# Patient Record
Sex: MALE | Race: BLACK OR AFRICAN AMERICAN
[De-identification: names, ages, dates, MRNs, and addresses within clinical notes are randomized per-mention and may not be internally consistent; named-entity substitution may affect disease eponyms.]

---

## 2019-06-28 NOTE — DIAGNOSTIC IMAGING REPORT
EXAMINATION: PA and lateral views of the chest.



COMPARISON: None



CLINICAL HISTORY:  Preoperative study for urology procedure.

     

DISCUSSION:



Lines/tubes:  None.



Lungs:  The lungs are well inflated and clear. There is no evidence of

pneumonia or pulmonary edema.



Pleura:  There is no pleural effusion or pneumothorax.



Heart and mediastinum:  The cardiomediastinal silhouette is normal.



Bones and soft tissues:  No acute bony abnormalities.  



IMPRESSION: 

No acute cardiopulmonary abnormalities.











Signed by: Dr. Ronald Conde M.D. on 6/28/2019 2:11 PM

## 2019-07-01 ENCOUNTER — HOSPITAL ENCOUNTER (INPATIENT)
Dept: HOSPITAL 88 - OR | Age: 69
LOS: 2 days | Discharge: HOME HEALTH | DRG: 713 | End: 2019-07-03
Attending: INTERNAL MEDICINE | Admitting: INTERNAL MEDICINE
Payer: MEDICARE

## 2019-07-01 VITALS — SYSTOLIC BLOOD PRESSURE: 130 MMHG | DIASTOLIC BLOOD PRESSURE: 74 MMHG

## 2019-07-01 DIAGNOSIS — E78.5: ICD-10-CM

## 2019-07-01 DIAGNOSIS — N17.9: ICD-10-CM

## 2019-07-01 DIAGNOSIS — I25.10: ICD-10-CM

## 2019-07-01 DIAGNOSIS — N39.41: ICD-10-CM

## 2019-07-01 DIAGNOSIS — E78.00: ICD-10-CM

## 2019-07-01 DIAGNOSIS — N40.1: Primary | ICD-10-CM

## 2019-07-01 DIAGNOSIS — I10: ICD-10-CM

## 2019-07-01 DIAGNOSIS — I69.354: ICD-10-CM

## 2019-07-01 DIAGNOSIS — N32.81: ICD-10-CM

## 2019-07-01 DIAGNOSIS — R35.1: ICD-10-CM

## 2019-07-01 DIAGNOSIS — R33.8: ICD-10-CM

## 2019-07-01 LAB
ANION GAP SERPL CALC-SCNC: 17.3 MMOL/L (ref 8–16)
BASOPHILS # BLD AUTO: 0 10*3/UL (ref 0–0.1)
BASOPHILS # BLD AUTO: 0 10*3/UL (ref 0–0.1)
BASOPHILS NFR BLD AUTO: 0.2 % (ref 0–1)
BASOPHILS NFR BLD AUTO: 0.2 % (ref 0–1)
BUN SERPL-MCNC: 22 MG/DL (ref 7–26)
BUN/CREAT SERPL: 18 (ref 6–25)
CALCIUM SERPL-MCNC: 8.6 MG/DL (ref 8.4–10.2)
CHLORIDE SERPL-SCNC: 107 MMOL/L (ref 98–107)
CO2 SERPL-SCNC: 23 MMOL/L (ref 22–29)
DEPRECATED NEUTROPHILS # BLD AUTO: 4.3 10*3/UL (ref 2.1–6.9)
DEPRECATED NEUTROPHILS # BLD AUTO: 5.2 10*3/UL (ref 2.1–6.9)
EGFRCR SERPLBLD CKD-EPI 2021: > 60 ML/MIN (ref 60–?)
EOSINOPHIL # BLD AUTO: 0 10*3/UL (ref 0–0.4)
EOSINOPHIL # BLD AUTO: 0 10*3/UL (ref 0–0.4)
EOSINOPHIL NFR BLD AUTO: 0 % (ref 0–6)
EOSINOPHIL NFR BLD AUTO: 0 % (ref 0–6)
ERYTHROCYTE [DISTWIDTH] IN CORD BLOOD: 14.2 % (ref 11.7–14.4)
ERYTHROCYTE [DISTWIDTH] IN CORD BLOOD: 14.2 % (ref 11.7–14.4)
GLUCOSE SERPLBLD-MCNC: 100 MG/DL (ref 74–118)
HCT VFR BLD AUTO: 39.3 % (ref 38.2–49.6)
HCT VFR BLD AUTO: 39.9 % (ref 38.2–49.6)
HGB BLD-MCNC: 13.9 G/DL (ref 14–18)
HGB BLD-MCNC: 14.3 G/DL (ref 14–18)
LYMPHOCYTES # BLD: 0.3 10*3/UL (ref 1–3.2)
LYMPHOCYTES # BLD: 0.7 10*3/UL (ref 1–3.2)
LYMPHOCYTES NFR BLD AUTO: 12 % (ref 18–39.1)
LYMPHOCYTES NFR BLD AUTO: 5.3 % (ref 18–39.1)
MCH RBC QN AUTO: 28.3 PG (ref 28–32)
MCH RBC QN AUTO: 28.5 PG (ref 28–32)
MCHC RBC AUTO-ENTMCNC: 35.4 G/DL (ref 31–35)
MCHC RBC AUTO-ENTMCNC: 35.8 G/DL (ref 31–35)
MCV RBC AUTO: 79.5 FL (ref 81–99)
MCV RBC AUTO: 79.9 FL (ref 81–99)
MONOCYTES # BLD AUTO: 0.2 10*3/UL (ref 0.2–0.8)
MONOCYTES # BLD AUTO: 0.5 10*3/UL (ref 0.2–0.8)
MONOCYTES NFR BLD AUTO: 3.9 % (ref 4.4–11.3)
MONOCYTES NFR BLD AUTO: 9.5 % (ref 4.4–11.3)
NEUTS SEG NFR BLD AUTO: 77.6 % (ref 38.7–80)
NEUTS SEG NFR BLD AUTO: 89.6 % (ref 38.7–80)
PLATELET # BLD AUTO: 198 X10E3/UL (ref 140–360)
PLATELET # BLD AUTO: 230 X10E3/UL (ref 140–360)
POTASSIUM SERPL-SCNC: 3.3 MMOL/L (ref 3.5–5.1)
RBC # BLD AUTO: 4.92 X10E6/UL (ref 4.3–5.7)
RBC # BLD AUTO: 5.02 X10E6/UL (ref 4.3–5.7)
SODIUM SERPL-SCNC: 144 MMOL/L (ref 136–145)

## 2019-07-01 PROCEDURE — 0V508ZZ DESTRUCTION OF PROSTATE, VIA NATURAL OR ARTIFICIAL OPENING ENDOSCOPIC: ICD-10-PCS | Performed by: UROLOGY

## 2019-07-01 PROCEDURE — 36415 COLL VENOUS BLD VENIPUNCTURE: CPT

## 2019-07-01 PROCEDURE — 76998 US GUIDE INTRAOP: CPT

## 2019-07-01 PROCEDURE — BT141ZZ FLUOROSCOPY OF KIDNEYS, URETERS AND BLADDER USING LOW OSMOLAR CONTRAST: ICD-10-PCS | Performed by: UROLOGY

## 2019-07-01 PROCEDURE — 76872 US TRANSRECTAL: CPT

## 2019-07-01 PROCEDURE — 85025 COMPLETE CBC W/AUTO DIFF WBC: CPT

## 2019-07-01 PROCEDURE — 93005 ELECTROCARDIOGRAM TRACING: CPT

## 2019-07-01 PROCEDURE — 0T788ZZ DILATION OF BILATERAL URETERS, VIA NATURAL OR ARTIFICIAL OPENING ENDOSCOPIC: ICD-10-PCS | Performed by: UROLOGY

## 2019-07-01 PROCEDURE — 88305 TISSUE EXAM BY PATHOLOGIST: CPT

## 2019-07-01 PROCEDURE — 0VB08ZX EXCISION OF PROSTATE, VIA NATURAL OR ARTIFICIAL OPENING ENDOSCOPIC, DIAGNOSTIC: ICD-10-PCS | Performed by: UROLOGY

## 2019-07-01 PROCEDURE — 97139 UNLISTED THERAPEUTIC PX: CPT

## 2019-07-01 PROCEDURE — 83735 ASSAY OF MAGNESIUM: CPT

## 2019-07-01 PROCEDURE — 80048 BASIC METABOLIC PNL TOTAL CA: CPT

## 2019-07-01 PROCEDURE — 74420 UROGRAPHY RTRGR +-KUB: CPT

## 2019-07-01 PROCEDURE — 71046 X-RAY EXAM CHEST 2 VIEWS: CPT

## 2019-07-01 RX ADMIN — DOCUSATE SODIUM SCH MG: 100 TABLET, FILM COATED ORAL at 18:43

## 2019-07-01 RX ADMIN — TAZOBACTAM SODIUM AND PIPERACILLIN SODIUM SCH MLS/HR: 375; 3 INJECTION, SOLUTION INTRAVENOUS at 21:15

## 2019-07-01 RX ADMIN — ATROPA BELLADONNA AND OPIUM PRN MG: 16.2; 6 SUPPOSITORY RECTAL at 18:43

## 2019-07-01 RX ADMIN — Medication SCH MG: at 18:43

## 2019-07-01 RX ADMIN — ACETAMINOPHEN AND CODEINE PHOSPHATE PRN EA: 300; 30 TABLET ORAL at 21:15

## 2019-07-01 RX ADMIN — POTASSIUM CHLORIDE, DEXTROSE MONOHYDRATE AND SODIUM CHLORIDE SCH MLS/HR: 150; 5; 450 INJECTION, SOLUTION INTRAVENOUS at 16:36

## 2019-07-01 NOTE — NUR
Pt received from PACU s/p TURP. Alert and oriented x3, with 3 way sierra draining cranberry 
colored urine. Saline lock to right hand with fluid infusing. Oriented to staff and 
surroundings. Encouraged to press call bell if help needed. Pt verbalized understanding of 
teaching. Will monitor

## 2019-07-01 NOTE — DIAGNOSTIC IMAGING REPORT
Transrectal ultrasound of the prostate, ultrasound guidance. 



TECHNIQUE: Sonographic guidance was provided to Dr. Rahat Jones for the purposes

of a transrectal prostate biopsy.



HISTORY: Elevated PSA



FINDINGS: 

The seminal vesicles are present and unremarkable.

 

The gland size measures  33.33 cubic cm.



The peripheral zone is homogeneous without focal nodules.



The transition zone demonstrates a BPH nodule in the left lobe.



IMPRESSION:

 As above.    



Signed by: Dr. Rishabh Prince MD on 7/1/2019 9:41 PM

## 2019-07-01 NOTE — XMS REPORT
Patient Summary Document

                             Created on: 2019



JOJO MOCTEZUMA

External Reference #: [id number]

: 1950

Sex: Male



Demographics







                          Address                   [address] [place] [address]

 

                          Home Phone                [phone]

 

                          Preferred Language        Unknown

 

                          Marital Status            Unknown

 

                          Yazdanism Affiliation     Unknown

 

                          Race                      Unknown

 

                                        Additional Race(s)  

 

                          Ethnic Group              Unknown





Author







                          Author                    [facility]

 

                          Address                   Unknown

 

                          Phone                     Unavailable







Care Team Providers







                    Care Team Member Name    Role                Phone

 

                    AMANDA DE LA FUENTE        Unavailable         Unavailable







Problems

This patient has no known problems.



Allergies, Adverse Reactions, Alerts

This patient has no known allergies or adverse reactions.



Medications

This patient has no known medications.



Results







           Test Description    Test Time    Test Comments    Text Results    Atomic Results    Result

 Comments

 

                CHEST 2 VIEWS    2019 14:09:00                                                             

                                             [facility]  
                     [address] [address]  
   Patient Name: JOJO MOCTEZUMA III                                   MR #: 
[id number]                     : 1950                                  
Age/Sex: 68/M  Acct #: [id number]                              Req #: [id number]
[id number]  Adm Physician:                                                      
Ordered by: AMANDA DE LA FUENTE MD                            Report #: [id number]       
Location: OR                                      Room/Bed:                     
___________________________________________________________________________________________________
   Procedure: [id number] DX/CHEST 2 VIEWS  Exam Date: 19                   
        Exam Time: 1330                                              REPORT 
STATUS: Signed       EXAMINATION: PA and lateral views of the chest.      LAWRENCE
RISON: None      CLINICAL HISTORY:  Preoperative study for urology procedure.   
       DISCUSSION:      Lines/tubes:  None.      Lungs:  The lungs are well 
inflated and clear. There is no evidence of   pneumonia or pulmonary edema.     
Pleura:  There is no pleural effusion or pneumothorax.      Heart and 
mediastinum:  The cardiomediastinal silhouette is normal.      Bones and soft 
tissues:  No acute bony abnormalities.        IMPRESSION:    No acute 
cardiopulmonary abnormalities.                  Signed by: Dr. Ubaldo Patel M.D. on 2019 2:11 PM        Dictated By: UBALDO PATEL MD  Electronically 
Signed By: UBALDO PATEL MD on 19 1411  Transcribed By: MAURICIO on 
19 1411       COPY TO:   AMANDA DE LA FUENTE MD

## 2019-07-02 VITALS — DIASTOLIC BLOOD PRESSURE: 101 MMHG | SYSTOLIC BLOOD PRESSURE: 175 MMHG

## 2019-07-02 VITALS — SYSTOLIC BLOOD PRESSURE: 162 MMHG | DIASTOLIC BLOOD PRESSURE: 96 MMHG

## 2019-07-02 VITALS — DIASTOLIC BLOOD PRESSURE: 83 MMHG | SYSTOLIC BLOOD PRESSURE: 143 MMHG

## 2019-07-02 VITALS — SYSTOLIC BLOOD PRESSURE: 154 MMHG | DIASTOLIC BLOOD PRESSURE: 97 MMHG

## 2019-07-02 VITALS — DIASTOLIC BLOOD PRESSURE: 89 MMHG | SYSTOLIC BLOOD PRESSURE: 176 MMHG

## 2019-07-02 VITALS — SYSTOLIC BLOOD PRESSURE: 176 MMHG | DIASTOLIC BLOOD PRESSURE: 89 MMHG

## 2019-07-02 VITALS — SYSTOLIC BLOOD PRESSURE: 143 MMHG | DIASTOLIC BLOOD PRESSURE: 83 MMHG

## 2019-07-02 VITALS — SYSTOLIC BLOOD PRESSURE: 107 MMHG | DIASTOLIC BLOOD PRESSURE: 62 MMHG

## 2019-07-02 LAB
ANION GAP SERPL CALC-SCNC: 14.5 MMOL/L (ref 8–16)
BASOPHILS # BLD AUTO: 0 10*3/UL (ref 0–0.1)
BASOPHILS NFR BLD AUTO: 0.1 % (ref 0–1)
BUN SERPL-MCNC: 23 MG/DL (ref 7–26)
BUN/CREAT SERPL: 16 (ref 6–25)
CALCIUM SERPL-MCNC: 8.5 MG/DL (ref 8.4–10.2)
CHLORIDE SERPL-SCNC: 110 MMOL/L (ref 98–107)
CO2 SERPL-SCNC: 22 MMOL/L (ref 22–29)
DEPRECATED NEUTROPHILS # BLD AUTO: 7.9 10*3/UL (ref 2.1–6.9)
EGFRCR SERPLBLD CKD-EPI 2021: 57 ML/MIN (ref 60–?)
EOSINOPHIL # BLD AUTO: 0.2 10*3/UL (ref 0–0.4)
EOSINOPHIL NFR BLD AUTO: 2 % (ref 0–6)
ERYTHROCYTE [DISTWIDTH] IN CORD BLOOD: 14 % (ref 11.7–14.4)
GLUCOSE SERPLBLD-MCNC: 112 MG/DL (ref 74–118)
HCT VFR BLD AUTO: 36.5 % (ref 38.2–49.6)
HGB BLD-MCNC: 12.9 G/DL (ref 14–18)
LYMPHOCYTES # BLD: 0.5 10*3/UL (ref 1–3.2)
LYMPHOCYTES NFR BLD AUTO: 5.8 % (ref 18–39.1)
LYMPHOCYTES NFR BLD MANUAL: 5 % (ref 19–48)
MCH RBC QN AUTO: 28 PG (ref 28–32)
MCHC RBC AUTO-ENTMCNC: 35.3 G/DL (ref 31–35)
MCV RBC AUTO: 79.2 FL (ref 81–99)
MONOCYTES # BLD AUTO: 0.6 10*3/UL (ref 0.2–0.8)
MONOCYTES NFR BLD AUTO: 6.6 % (ref 4.4–11.3)
MONOCYTES NFR BLD MANUAL: 7 % (ref 3.4–9)
NEUTS SEG NFR BLD AUTO: 85.2 % (ref 38.7–80)
NEUTS SEG NFR BLD MANUAL: 88 % (ref 40–74)
PLAT MORPH BLD: NORMAL
PLATELET # BLD AUTO: 202 X10E3/UL (ref 140–360)
PLATELET # BLD EST: ADEQUATE 10*3/UL
POTASSIUM SERPL-SCNC: 3.5 MMOL/L (ref 3.5–5.1)
RBC # BLD AUTO: 4.61 X10E6/UL (ref 4.3–5.7)
RBC MORPH BLD: NORMAL
SODIUM SERPL-SCNC: 143 MMOL/L (ref 136–145)

## 2019-07-02 RX ADMIN — TAZOBACTAM SODIUM AND PIPERACILLIN SODIUM SCH MLS/HR: 375; 3 INJECTION, SOLUTION INTRAVENOUS at 21:55

## 2019-07-02 RX ADMIN — Medication SCH MG: at 09:28

## 2019-07-02 RX ADMIN — DOCUSATE SODIUM SCH MG: 100 TABLET, FILM COATED ORAL at 09:28

## 2019-07-02 RX ADMIN — METOPROLOL TARTRATE SCH MG: 25 TABLET, FILM COATED ORAL at 17:59

## 2019-07-02 RX ADMIN — ATROPA BELLADONNA AND OPIUM PRN MG: 16.2; 6 SUPPOSITORY RECTAL at 13:20

## 2019-07-02 RX ADMIN — METOPROLOL TARTRATE SCH MG: 25 TABLET, FILM COATED ORAL at 09:28

## 2019-07-02 RX ADMIN — OXYBUTYNIN CHLORIDE SCH MG: 5 TABLET ORAL at 20:52

## 2019-07-02 RX ADMIN — POTASSIUM CHLORIDE, DEXTROSE MONOHYDRATE AND SODIUM CHLORIDE SCH MLS/HR: 150; 5; 450 INJECTION, SOLUTION INTRAVENOUS at 20:52

## 2019-07-02 RX ADMIN — OXYBUTYNIN CHLORIDE SCH MG: 5 TABLET ORAL at 15:00

## 2019-07-02 RX ADMIN — OXYBUTYNIN CHLORIDE SCH MG: 5 TABLET ORAL at 09:28

## 2019-07-02 RX ADMIN — POTASSIUM CHLORIDE, DEXTROSE MONOHYDRATE AND SODIUM CHLORIDE SCH MLS/HR: 150; 5; 450 INJECTION, SOLUTION INTRAVENOUS at 09:27

## 2019-07-02 RX ADMIN — Medication SCH MG: at 13:10

## 2019-07-02 RX ADMIN — TAZOBACTAM SODIUM AND PIPERACILLIN SODIUM SCH MLS/HR: 375; 3 INJECTION, SOLUTION INTRAVENOUS at 15:00

## 2019-07-02 RX ADMIN — ACETAMINOPHEN AND CODEINE PHOSPHATE PRN EA: 300; 30 TABLET ORAL at 07:16

## 2019-07-02 RX ADMIN — ATROPA BELLADONNA AND OPIUM PRN MG: 16.2; 6 SUPPOSITORY RECTAL at 03:15

## 2019-07-02 RX ADMIN — Medication SCH MG: at 17:59

## 2019-07-02 RX ADMIN — TAZOBACTAM SODIUM AND PIPERACILLIN SODIUM SCH MLS/HR: 375; 3 INJECTION, SOLUTION INTRAVENOUS at 05:24

## 2019-07-02 RX ADMIN — POTASSIUM CHLORIDE, DEXTROSE MONOHYDRATE AND SODIUM CHLORIDE SCH MLS/HR: 150; 5; 450 INJECTION, SOLUTION INTRAVENOUS at 00:03

## 2019-07-02 RX ADMIN — DOCUSATE SODIUM SCH MG: 100 TABLET, FILM COATED ORAL at 16:05

## 2019-07-02 RX ADMIN — ACETAMINOPHEN AND CODEINE PHOSPHATE PRN EA: 300; 30 TABLET ORAL at 13:03

## 2019-07-02 NOTE — NUR
Patient c/o severe bladder spasms, urine appears light pink w/ small blood clots, bleeding 
noted around penile area, attempted to irrigate sierra catheter, resistance was noted, Dr. Jones informed on patient status

## 2019-07-02 NOTE — NUR
pt alert resp even and unlabored at this time no distress noted, pt able to make needs 
known,no c/o pain when asked, occasional bladder spasms Dr. Jones aware, CBI in place, will 
cont to monitor,  pt call light in reach.

## 2019-07-02 NOTE — HISTORY AND PHYSICAL
PRIMARY CARE PHYSICIAN:  Dr. America Feliciano.

 

SURGEON:  Dr. Rahat Jones. 

 

The patient is status post TURP procedures.

 

HISTORY:  A 68-year-old male status post TURP, cystoscopy retrograde done by Dr. Rahat Jones.  The patient is admitted for postoperative care.  He is otherwise stable.  No

chest pain, no shortness of breath. 

 

PAST MEDICAL HISTORY:  Enlarged prostate status post TURP and prostate biopsy.  Urinary

retention.  Hypertension.  Coronary artery disease.  History of CVA with left-sided

residual weakness. 

 

SOCIAL HISTORY:  The patient does not smoke or use alcohol.  No recreational drug use.

 

ALLERGIES:  NO KNOWN ALLERGIES.

 

HOME MEDICATIONS:  Lipitor, B12, Plavix, finasteride, metoprolol tartrate, Flomax.

 

PHYSICAL EXAMINATION:

VITAL SIGNS:  Temperature is 100, blood pressure 176/89, pulse rate 96, respirations 18. 

GENERAL:  The patient is not in acute distress.  He is awake. 

HEENT:  Normocephalic, atraumatic.  Sclerae anicteric. 

NECK:  Supple grossly. 

PULMONARY:  Diminished breath sounds without any wheezing or rales. 

CARDIOVASCULAR:  S1, S2.  Regular rate and rhythm. 

ABDOMEN:  Soft.  Status post TURP.  Begum catheter in place. 

EXTREMITIES:  No cyanosis or edema. 

NEUROLOGIC:  No gross focal deficit.

LABORATORY DATA:  Sodium is 143, potassium 3.5, chloride 110, bicarb 22, BUN 23,

creatinine 1.48, glucose 112. 

 

WBC is 9.2, hemoglobin 12.9, hematocrit 36.5, platelets is 202.

 

IMPRESSION:  

1. Status post TURP for urinary retention and microscopic hematuria with increase in PSA.

2. Multiple chronic baseline problems.

 

PLAN:  Home medication resumed except for aspirin and Plavix.  Continue with physical

therapy.  Postoperative care.  Continue with irrigation of the urinary bladder.  We will

check the patient's lab work. 

 

 

 

 

______________________________

MD ANTONIO Franks/KARMEN

D:  07/02/2019 09:01:31

T:  07/02/2019 09:33:05

Job #:  [id number]

## 2019-07-02 NOTE — NUR
CM SPOKE TO PATIENT AND PATIENT EX WIFE CAROL REGARDING DISCHARGE PLAN AND PLAN FO CARE. 
PATIENT AWARE AND PATIENT AND PATIENT EX-WIFE AGREE TO HAVE HOME HEALTH SERVICES FOR SN EVAL 
AND  CARE. PATIENT AND PATIENT EX-WIFE GIVEN CHOICES AT BEDSIDE. PATIENT AND PATIENT 
WIFE REQUESTED [facility]. CHOICE LETTER SIGNED BY EX-WIFE AS DESIGNATED BY 
PATIENT DUE TO WEAKNESS AND UNABLE TO SIGN. CM CALLED [facility] FOR FAX NUMBER, 
NO ANSWER; CM LM TO RECEIVE CALL FOR FAX INFORMATION SO THEY CAN RUN PATIENT BENEFITS. 
PATIENT AND PATIENT EX WIFE AWARE AND AGREE TO PAY OUT OF POCKET FEES IF HOME HEALTH IS NOT 
IN SERVICE. THEY ARE INFORMED IF THEY CHANGE THEIR MINDS AND THEY ARE DISCHARGED, THEY CAN 
GO TO PCP FOR ORDER. 



[facility]

(P)[phone]

(F) PENDING RETURN CALL FOR FAX

LIAISON: VIANEY RANDLE

## 2019-07-03 VITALS — DIASTOLIC BLOOD PRESSURE: 75 MMHG | SYSTOLIC BLOOD PRESSURE: 138 MMHG

## 2019-07-03 VITALS — DIASTOLIC BLOOD PRESSURE: 74 MMHG | SYSTOLIC BLOOD PRESSURE: 110 MMHG

## 2019-07-03 VITALS — SYSTOLIC BLOOD PRESSURE: 113 MMHG | DIASTOLIC BLOOD PRESSURE: 67 MMHG

## 2019-07-03 VITALS — SYSTOLIC BLOOD PRESSURE: 136 MMHG | DIASTOLIC BLOOD PRESSURE: 70 MMHG

## 2019-07-03 VITALS — SYSTOLIC BLOOD PRESSURE: 136 MMHG

## 2019-07-03 LAB
ANION GAP SERPL CALC-SCNC: 12.5 MMOL/L (ref 8–16)
BASOPHILS # BLD AUTO: 0 10*3/UL (ref 0–0.1)
BASOPHILS NFR BLD AUTO: 0.2 % (ref 0–1)
BUN SERPL-MCNC: 20 MG/DL (ref 7–26)
BUN/CREAT SERPL: 16 (ref 6–25)
CALCIUM SERPL-MCNC: 8.6 MG/DL (ref 8.4–10.2)
CHLORIDE SERPL-SCNC: 110 MMOL/L (ref 98–107)
CO2 SERPL-SCNC: 24 MMOL/L (ref 22–29)
DEPRECATED NEUTROPHILS # BLD AUTO: 4.3 10*3/UL (ref 2.1–6.9)
EGFRCR SERPLBLD CKD-EPI 2021: > 60 ML/MIN (ref 60–?)
EOSINOPHIL # BLD AUTO: 0.2 10*3/UL (ref 0–0.4)
EOSINOPHIL NFR BLD AUTO: 3.1 % (ref 0–6)
ERYTHROCYTE [DISTWIDTH] IN CORD BLOOD: 14.1 % (ref 11.7–14.4)
GLUCOSE SERPLBLD-MCNC: 93 MG/DL (ref 74–118)
HCT VFR BLD AUTO: 34.1 % (ref 38.2–49.6)
HGB BLD-MCNC: 11.9 G/DL (ref 14–18)
LYMPHOCYTES # BLD: 0.7 10*3/UL (ref 1–3.2)
LYMPHOCYTES NFR BLD AUTO: 12.2 % (ref 18–39.1)
MCH RBC QN AUTO: 27.9 PG (ref 28–32)
MCHC RBC AUTO-ENTMCNC: 34.9 G/DL (ref 31–35)
MCV RBC AUTO: 80 FL (ref 81–99)
MONOCYTES # BLD AUTO: 0.6 10*3/UL (ref 0.2–0.8)
MONOCYTES NFR BLD AUTO: 9.6 % (ref 4.4–11.3)
NEUTS SEG NFR BLD AUTO: 74.4 % (ref 38.7–80)
PLATELET # BLD AUTO: 187 X10E3/UL (ref 140–360)
POTASSIUM SERPL-SCNC: 3.5 MMOL/L (ref 3.5–5.1)
RBC # BLD AUTO: 4.26 X10E6/UL (ref 4.3–5.7)
SODIUM SERPL-SCNC: 143 MMOL/L (ref 136–145)

## 2019-07-03 RX ADMIN — METOPROLOL TARTRATE SCH MG: 25 TABLET, FILM COATED ORAL at 16:37

## 2019-07-03 RX ADMIN — METOPROLOL TARTRATE SCH MG: 25 TABLET, FILM COATED ORAL at 08:58

## 2019-07-03 RX ADMIN — DOCUSATE SODIUM SCH MG: 100 TABLET, FILM COATED ORAL at 16:37

## 2019-07-03 RX ADMIN — Medication SCH MG: at 12:22

## 2019-07-03 RX ADMIN — OXYBUTYNIN CHLORIDE SCH MG: 5 TABLET ORAL at 15:30

## 2019-07-03 RX ADMIN — Medication SCH MG: at 16:38

## 2019-07-03 RX ADMIN — AMLODIPINE BESYLATE SCH MG: 5 TABLET ORAL at 16:38

## 2019-07-03 RX ADMIN — POTASSIUM CHLORIDE, DEXTROSE MONOHYDRATE AND SODIUM CHLORIDE SCH MLS/HR: 150; 5; 450 INJECTION, SOLUTION INTRAVENOUS at 10:58

## 2019-07-03 RX ADMIN — OXYBUTYNIN CHLORIDE SCH MG: 5 TABLET ORAL at 08:58

## 2019-07-03 RX ADMIN — TAZOBACTAM SODIUM AND PIPERACILLIN SODIUM SCH MLS/HR: 375; 3 INJECTION, SOLUTION INTRAVENOUS at 05:29

## 2019-07-03 RX ADMIN — TAZOBACTAM SODIUM AND PIPERACILLIN SODIUM SCH MLS/HR: 375; 3 INJECTION, SOLUTION INTRAVENOUS at 14:30

## 2019-07-03 RX ADMIN — DOCUSATE SODIUM SCH MG: 100 TABLET, FILM COATED ORAL at 08:58

## 2019-07-03 RX ADMIN — AMLODIPINE BESYLATE SCH MG: 5 TABLET ORAL at 08:58

## 2019-07-03 RX ADMIN — ACETAMINOPHEN AND CODEINE PHOSPHATE PRN EA: 300; 30 TABLET ORAL at 14:31

## 2019-07-03 RX ADMIN — Medication SCH MG: at 08:59

## 2019-07-03 NOTE — NUR
DISCHARGE DISPOSITION:



PATIENT DISCHARGING HOME WITH HOME HEALTH SERVICES WITH THE FOLLOWING HOME HEALTH COMPANY 
FOR CATHETER MANAGEMENT,SN EVAL AND PT/OT SERVICES:





[facility] 

(P) [phone]

(F) [phone]

LIAISON: EDILBERTO STEVENS

## 2019-07-03 NOTE — NUR
CM SPOKE TO PATIENT EX- WIFE/ POA CAROL MOCTEZUMA AND PATIENT REGARDING HOME HEALTH COMPANY 
THEY HAVE CHOSEN ARE NOT IN NETWORK FOR HOME HEALTH. THEY DECIDED TO GO THROUGH INSURANCE 
WITH [facility] FOR IMMEDIATE SERVICES UPON DISCHARGE AND USE [facility] 
AS PROVIDER CARE AT A LATER DATE. [facility] WAS THEIR NUMBER TWO CHOICE ON CHOICE LETTER.  
CLINICAL SENT TO [facility]. THEY ARE ACCEPTED AND WILL RECEIVE CARE DAY AFTER 
DISCHARGE. 



[facility] 

(P) [phone]

(F) [phone]

LIAISON: EDILBERTO STEVENS

## 2019-07-03 NOTE — NUR
PT RESTING IN BED COMFORTABLY AA0X3

PT DENIES ANY ACTIVE PAIN, STATES DISCOMFORT TO URETHRA (BURNING)

PT HAS A  CATH 3WAY ON CBI. PENIS AREA SHOWING MINIMAL LEAKING OF PINK TINGED URINE

TOWEL LOCATED ON PENIS AREA FOR LEAKAGE COLLECTION

PT  CATH BAG DRAINING CLEAR YELLOW URINE. NO CLOTS NOTED (DECREASED CBI RATE)

PT HAS IV FLUIDS TO THE RIGHT HAND 20 WITH D51/[id number] AT 125CC.HR. SITE IS CLEAN AND DRY

PT IS ON RA . NO SOB NOTED

SED AND COMPRESSION STOCKINGS PRESENT

WILL CONTINUE TO MONITOR PT CLOSELY, SIDE RAILX2, BED WHEELS LOCKED, CALL LIGHT IS WITHIN 

EASY REACH, INSTRUCTED TO CALL FOR ASSISTANCE IF NEEDED

## 2019-07-29 NOTE — OPERATIVE REPORT
DATE OF PROCEDURE:  07/01/2019

 

SURGEON:  Rahat Jones MD

 

PREOPERATIVE DIAGNOSES:  

1. Obstructive benign prostatic hypertrophy.

2. Urinary retention.

3. Elevated PSA.

 

POSTOPERATIVE DIAGNOSES:  

1. Obstructive benign prostatic hypertrophy.

2. Urinary retention.

3. Elevated PSA.

 

OPERATION PERFORMED:  

1. Transrectal sonography interpretation.

2. Interpretation for ultrasonographic guidance for needle biopsies.

3. Transrectal needle biopsies of the prostate (separate procedure performed for the

elevated PSA). 

4. Cystourethroscopy with bilateral ureteral catheterization and retrograde

ureteropyelography (separately performed to evaluate the upper tract in light of the

urinary retention. 

5. Interpretation of retrograde ureteropyelography.

6. Supervision of fluoroscopy, no radiologist present.

7. Cystourethroscopy with staged transurethral resection of the prostate utilizing the

plasma button electrode. 

 

ANESTHESIA:  General.

 

COMPLICATIONS:  None.

 

CLINICAL SUMMARY:  Alfredito Cordon III is a 68-year-old man with a history of urinary

retention.  His postvoid residual was over 150 mL by urodynamic study.  The patient has

had this symptomatology.  This was despite alpha-blocker medication.  He was brought for

the above procedures.  He is aware of the risks of bleeding, infection, injury to

adjacent structures, need for additional procedures, and elected to proceed. 

 

OPERATIVE PROCEDURE IN DETAIL:  Informed consent was verified.  Alfredito Cordon III was

properly identified, taken to the operating room, placed on the cystoscopy table in

supine position.  Anesthesia was uneventfully begun.  The patient was then carefully and

gently repositioned in the dorsal lithotomy position with all pressure points well

padded.  The ultrasound probe was placed in the patient's rectum and sonography was

performed. 

 

Interpretation of prostate ultrasonography, real-time ultrasonography revealed diffuse

calcifications.  In both the transition and peripheral zones, there was a heterogeneity

throughout the entire prostate.  The prostate capsule was smooth.  The seminal vesicles

were unremarkable.  The prostate size was estimated at 33 mL, but this was believed to

be an underestimation due to poor calculation of the patient's prostate length. 

 

With ultrasonographic guidance, needle biopsies of the prostate were taken.  A total of

2 biopsies were taken at each of 6 places.  These were sent separately in containers

labeled right versus left and base versus mid versus apex. 

 

The patient's genitalia were then prepared and draped in usual sterile fashion.  The

cystoscope sheath with the visual obturator in place was atraumatically inserted into

the patient's urethra and was guided unremarkably.  Distal urethra through the

sphincteric region significant for mucosal erythema, most likely due to chronic Begum

trauma and irritation.  We entered the patient's prostate bed, which was significant for

trilobar prostatic hypertrophy with visual obstruction.  We entered the patient's

bladder, which exhibited grade 3 trabeculations with cellule formations throughout,

possibly grade 4 trabeculations with early diverticular formation.  No suspicious

lesions were identified. 

 

A ureteral catheter was used to cannulate each ureter and retrograde ureteral pyelograms

were performed. 

 

Interpretation of retrograde ureteropyelography contrast was instilled in retrograde

fashion bilaterally.  There were no tumors, no stones, no diverticula.  Bilateral J

hooking was noted.  Unobstructed drainage was observed fluoroscopically. 

 

The cystoscope was withdrawn.  Atraumatically, we placed the continuous-flow

resectoscope sheath.  We then proceeded with performing a transurethral resection of the

prostate utilizing a plasma button electrode.  First, we eliminated the median lobe and

then we worked from the bladder neck to maneuver past the verumontanum and vaporized

down the surgical capsule.  Hemostasis was obtained with pinpoint electrocautery.  As we

performed this resection, we released rather significant amount of secretions that were

imbedded within the patient's prostate bed.  This was consistent with chronic

prostatitis. 

 

The resectoscope was withdrawn and Begum catheter was placed.  It was irrigated to and

fro to ensure it worked properly.  A belladonna and opium suppository were placed

revealing a 40 g prostate, smooth and non-fluctuant without any nodules.  The patient

was then uneventfully reversed from anesthesia and taken to recovery room in stable

condition.  Explicit postoperative 

instructions were given.  We will proceed with routine postoperative care and of course

ongoing Urological followup. 

 

 

 

 

______________________________

Rahat Jones MD

 

OH/MODL

D:  07/28/2019 22:05:35

T:  07/29/2019 02:38:45

Job #:  [id number]

 

cc:            America Feliciano MD